# Patient Record
Sex: FEMALE | Race: WHITE | NOT HISPANIC OR LATINO | Employment: OTHER | URBAN - METROPOLITAN AREA
[De-identification: names, ages, dates, MRNs, and addresses within clinical notes are randomized per-mention and may not be internally consistent; named-entity substitution may affect disease eponyms.]

---

## 2022-02-15 ENCOUNTER — OFFICE VISIT (OUTPATIENT)
Dept: FAMILY MEDICINE CLINIC | Facility: CLINIC | Age: 22
End: 2022-02-15

## 2022-02-15 VITALS
DIASTOLIC BLOOD PRESSURE: 64 MMHG | WEIGHT: 125.6 LBS | HEART RATE: 68 BPM | TEMPERATURE: 96.9 F | RESPIRATION RATE: 16 BRPM | SYSTOLIC BLOOD PRESSURE: 86 MMHG | OXYGEN SATURATION: 98 %

## 2022-02-15 DIAGNOSIS — O03.9 MISCARRIAGE: Primary | ICD-10-CM

## 2022-02-15 PROCEDURE — 99203 OFFICE O/P NEW LOW 30 MIN: CPT | Performed by: NURSE PRACTITIONER

## 2022-02-15 NOTE — PROGRESS NOTES
Chief Complaint  Establish Care (No other complaints) and Abdominal Cramping (Lower area started last week with bleeding, bleeding has stop as of yesterday.)    Subjective          Deepika Birmingham presents to [facility]  History of Present Illness  New pt to me today.    Period was 4 days late but then very heavy with a large clot.  She also experienced abdominal cramping.  She bleed for 7 days when her normal period usually only last 3-4 days.  She passed several clots the entire time.  Stopped bleeding yesterday but still experiencing abdominal cramping that is off and on.  No lightheadedness, syncope, sweating. SHe feels good otherwise  Prior to this her periods are every 28 days.   She has appointment with OBGYN the first week of march.         Objective   Vital Signs:   BP (!) 86/64   Pulse 68   Temp 96.9 °F (36.1 °C)   Resp 16   Wt 57 kg (125 lb 9.6 oz)   SpO2 98%     Physical Exam  Vitals reviewed.   Constitutional:       General: She is not in acute distress.     Appearance: She is well-developed.   HENT:      Head: Normocephalic.   Cardiovascular:      Rate and Rhythm: Normal rate and regular rhythm.      Heart sounds: Normal heart sounds.   Pulmonary:      Effort: Pulmonary effort is normal.      Breath sounds: Normal breath sounds.   Neurological:      Mental Status: She is alert and oriented to person, place, and time.      Gait: Gait normal.   Psychiatric:         Behavior: Behavior normal.         Thought Content: Thought content normal.         Judgment: Judgment normal.        Result Review :                 Assessment and Plan    Diagnoses and all orders for this visit:    1. Miscarriage (Primary)        Follow Up   Return if symptoms worsen or fail to improve.  Patient was given instructions and counseling regarding her condition or for health maintenance advice. Please see specific information pulled into the AVS if appropriate.     Questionable miscarriage.  I  reassured her and counseled her that what she is going through is normal for miscarriage.  If the bleeding returns and is extremely heavy her abdominal pain gets worse she does need to go to the emergency room for possible hemorrhage.  Maintain appointment with OB/GYN in March.  Takes some over-the-counter iron just for the blood loss that she is experienced over the past 7 days.  Patient verbalized understanding    Mask and googles worn

## 2022-02-28 ENCOUNTER — OFFICE VISIT (OUTPATIENT)
Dept: OBSTETRICS AND GYNECOLOGY | Age: 22
End: 2022-02-28

## 2022-02-28 VITALS
DIASTOLIC BLOOD PRESSURE: 70 MMHG | HEIGHT: 62 IN | WEIGHT: 126.6 LBS | BODY MASS INDEX: 23.3 KG/M2 | SYSTOLIC BLOOD PRESSURE: 110 MMHG

## 2022-02-28 DIAGNOSIS — Z01.419 ENCOUNTER FOR GYNECOLOGICAL EXAMINATION WITHOUT ABNORMAL FINDING: Primary | ICD-10-CM

## 2022-02-28 PROCEDURE — 99385 PREV VISIT NEW AGE 18-39: CPT | Performed by: OBSTETRICS & GYNECOLOGY

## 2022-02-28 NOTE — PROGRESS NOTES
Routine Annual Visit    2022    Patient: Deepika Birmingham          MR#:[id number]      Chief Complaint   Patient presents with   • Gynecologic Exam     New pt, AE Today.   • Establish Care       History of Present Illness    21 y.o. female  who presents for annual exam.   Regular, monthly menses.     Had late menses recently and then had very heavy bleeding with passage of clots, nausea, diarrhea. Did not take pregnancy test then, wonders if may have been a miscarriage    Considering regnancy in  maybe  Condoms and NFP Til then    Moved here from [place] in January, went to [employer] in the past.  Plans to become a counselor. Her  is current at the [facility]    Health Maintenance  Gardasil unsure  Last pap: none previously    Patient's last menstrual period was 2022 (exact date).  Obstetric History:  OB History        0    Para   0    Term   0       0    AB   0    Living   0       SAB   0    IAB   0    Ectopic   0    Molar   0    Multiple   0    Live Births   0               Menstrual History:     Patient's last menstrual period was 2022 (exact date).       ________________________________________  Patient Active Problem List   Diagnosis   • Miscarriage       History reviewed. No pertinent past medical history.    Family History   Problem Relation Age of Onset   • Melanoma Mother        History reviewed. No pertinent surgical history.    Social History     Tobacco Use   Smoking Status Never Smoker   Smokeless Tobacco Not on file       currently has no medications in their medication list.  ________________________________________      Review of Systems   Constitutional: Negative for fever and unexpected weight change.   Respiratory: Negative for shortness of breath.    Cardiovascular: Negative for chest pain.   Gastrointestinal: Negative for abdominal pain, constipation and diarrhea.   Genitourinary: Negative for frequency and urgency.   Hematological:  "Negative for adenopathy.   Psychiatric/Behavioral: Negative for dysphoric mood.       Objective   Physical Exam    /70   Ht 157.5 cm (62\")   Wt 57.4 kg (126 lb 9.6 oz)   LMP 02/09/2022 (Exact Date)   Breastfeeding No   BMI 23.16 kg/m²    BP Readings from Last 3 Encounters:   02/28/22 110/70   02/15/22 (!) 86/64      Wt Readings from Last 3 Encounters:   02/28/22 57.4 kg (126 lb 9.6 oz)   02/15/22 57 kg (125 lb 9.6 oz)         BMI: Body mass index is 23.16 kg/m².       General:   alert, appears stated age and cooperative   Neck: No thyromegaly or LAD   Heart:: regular rate and rhythm, S1, S2 normal, no murmur, click, rub or gallop   Lungs: normal respiratory effort and auscultation   Abdomen: soft, non-tender, without masses or organomegaly   Breast: inspection negative, no nipple discharge or bleeding, no masses or nodularity palpable   Urethra and bladder: urethral meatus normal; bladder nontender to palpation;   Vulva: normal, Bartholin's, Urethra, Cooper's normal   Vagina: normal mucosa, normal discharge   Cervix: no lesions and nulliparous appearance   Uterus: normal size and anteverted   Adnexa: normal adnexa and no mass, fullness, tenderness       Assessment:    normal annual exam   Considering pregnancy this year    Plan:    Plan     []  Mammogram request made  [x]  PAP done  []  Labs:   []  GC/Chl/TV- she declines  []  DEXA scan   []  Referral for colonoscopy:     Discussed checking varicella, rubella immune statuses prior to pregnancy, also genetic screening    Counseling  [x]  Nutrition  [x]  Physical activity/regular exercise   [x]  Healthy weight  []  Injury prevention  []  Smoking cessation  []  Substance misuse/abuse  [x]  Sexual behavior  [x]  STD prevention  [x]  Contraception  []  Dental health  []  Mental health  []  Immunization  [x]  Encouraged SBE        Loretta Haley MD  02/28/2022  14:43 EST    "

## 2022-03-01 ENCOUNTER — PATIENT ROUNDING (BHMG ONLY) (OUTPATIENT)
Dept: OBSTETRICS AND GYNECOLOGY | Age: 22
End: 2022-03-01

## 2022-03-01 NOTE — PROGRESS NOTES
A MY CHART MESSAGE HAS BEEN SENT TO THE PATIENT [place] [facility].

## 2022-03-04 LAB
CONV .: NORMAL
CYTOLOGIST CVX/VAG CYTO: NORMAL
CYTOLOGY CVX/VAG DOC CYTO: NORMAL
CYTOLOGY CVX/VAG DOC THIN PREP: NORMAL
DX ICD CODE: NORMAL
HIV 1 & 2 AB SER-IMP: NORMAL
OTHER STN SPEC: NORMAL
STAT OF ADQ CVX/VAG CYTO-IMP: NORMAL

## 2022-06-30 ENCOUNTER — OFFICE VISIT (OUTPATIENT)
Dept: OBSTETRICS AND GYNECOLOGY | Age: 22
End: 2022-06-30

## 2022-06-30 VITALS
HEIGHT: 62 IN | DIASTOLIC BLOOD PRESSURE: 68 MMHG | WEIGHT: 127 LBS | BODY MASS INDEX: 23.37 KG/M2 | SYSTOLIC BLOOD PRESSURE: 120 MMHG

## 2022-06-30 DIAGNOSIS — O21.9 NAUSEA AND VOMITING DURING PREGNANCY: ICD-10-CM

## 2022-06-30 DIAGNOSIS — O36.71X1: ICD-10-CM

## 2022-06-30 DIAGNOSIS — Z3A.08 8 WEEKS GESTATION OF PREGNANCY: Primary | ICD-10-CM

## 2022-06-30 PROCEDURE — 99214 OFFICE O/P EST MOD 30 MIN: CPT | Performed by: NURSE PRACTITIONER

## 2022-06-30 PROCEDURE — 76817 TRANSVAGINAL US OBSTETRIC: CPT | Performed by: OBSTETRICS & GYNECOLOGY

## 2022-06-30 RX ORDER — MULTIPLE VITAMINS W/ MINERALS TAB 9MG-400MCG
1 TAB ORAL DAILY
COMMUNITY

## 2022-06-30 NOTE — PROGRESS NOTES
"Subjective     Chief Complaint   Patient presents with   • Possible Pregnancy     Lmp 2022       Deepika Pope is a 21 y.o.  whose LMP is Patient's last menstrual period was 2022.     Pt presents today for confirmation of pregnancy  U/S confirms live IUP at 8 weeks  This is her first pregnancy  She is having some nausea, a couple episodes of vomiting  She is supposed to start working next week  She has no other concerns today  Pt of Dr. Haley     No Additional Complaints Reported    The following portions of the patient's history were reviewed and updated as appropriate:vital signs, allergies, current medications, past medical history, past social history, past surgical history and problem list      Review of Systems   Pertinent items are noted in HPI.     Objective      /68   Ht 157.5 cm (62\")   Wt 57.6 kg (127 lb)   LMP 2022   BMI 23.23 kg/m²     Physical Exam    General:   alert and no distress   Heart: Not performed today   Lungs: Not performed today.   Breast: Not performed today   Neck: na   Abdomen: {Not performed today   CVA: Not performed today   Pelvis: Not performed today   Extremities: Not performed today   Neurologic: negative   Psychiatric: Normal affect, judgement, and mood       Lab Review   Labs: No data reviewed     Imaging   Ultrasound - Pelvic Vaginal    Assessment & Plan     ASSESSMENT  1. 8 weeks gestation of pregnancy    2. Viable fetus in abdominal pregnancy in first trimester, fetus 1    3. Nausea and vomiting during pregnancy        PLAN  1.   Orders Placed This Encounter   Procedures   • Urine Culture - Urine, Urine, Clean Catch   • Chlamydia trachomatis, Neisseria gonorrhoeae, Trichomonas vaginalis, PCR - Swab, Urine, Clean Catch   • OB Panel With HIV   • Varicella Zoster Antibody, IgG   • Hemoglobinopathy Fractionation Cascade   • Drug Profile Urine - 9 Drugs - Urine, Clean Catch       2. Medications prescribed this encounter:        New Medications " Ordered This Visit   Medications   • doxylamine (UNISOM) 25 MG tablet     Sig: Take 1 tablet by mouth At Night As Needed for Nausea.     Dispense:  30 tablet     Refill:  1       3. Prenatal labs today. Prenatal folder given to patient. Info given on cfDNA/carrier screening. Unisom qhs prn for N/V. SAB warnings.    Follow up: 4 week(s) OB intake Dr Tera Dempsey, APRN  6/30/2022

## 2022-07-02 LAB
ABO GROUP BLD: ABNORMAL
AMPHETAMINES UR QL SCN: NEGATIVE NG/ML
BACTERIA UR CULT: NO GROWTH
BACTERIA UR CULT: NORMAL
BARBITURATES UR QL SCN: NEGATIVE NG/ML
BASOPHILS # BLD AUTO: 0.1 X10E3/UL (ref 0–0.2)
BASOPHILS NFR BLD AUTO: 0 %
BENZODIAZ UR QL: NEGATIVE NG/ML
BLD GP AB SCN SERPL QL: NEGATIVE
BZE UR QL: NEGATIVE NG/ML
C TRACH RRNA SPEC QL NAA+PROBE: NEGATIVE
CANNABINOIDS UR QL SCN: NEGATIVE NG/ML
EOSINOPHIL # BLD AUTO: 0.2 X10E3/UL (ref 0–0.4)
EOSINOPHIL NFR BLD AUTO: 2 %
ERYTHROCYTE [DISTWIDTH] IN BLOOD BY AUTOMATED COUNT: 11.5 % (ref 11.7–15.4)
HBV SURFACE AG SERPL QL IA: NEGATIVE
HCT VFR BLD AUTO: 41.6 % (ref 34–46.6)
HCV AB S/CO SERPL IA: <0.1 S/CO RATIO (ref 0–0.9)
HGB A MFR BLD ELPH: 97.5 % (ref 96.4–98.8)
HGB A2 MFR BLD ELPH: 2.5 % (ref 1.8–3.2)
HGB BLD-MCNC: 14.2 G/DL (ref 11.1–15.9)
HGB F MFR BLD ELPH: 0 % (ref 0–2)
HGB FRACT BLD-IMP: NORMAL
HGB S MFR BLD ELPH: 0 %
HIV 1+2 AB+HIV1 P24 AG SERPL QL IA: NON REACTIVE
IMM GRANULOCYTES # BLD AUTO: 0 X10E3/UL (ref 0–0.1)
IMM GRANULOCYTES NFR BLD AUTO: 0 %
LYMPHOCYTES # BLD AUTO: 2.1 X10E3/UL (ref 0.7–3.1)
LYMPHOCYTES NFR BLD AUTO: 18 %
MCH RBC QN AUTO: 30.3 PG (ref 26.6–33)
MCHC RBC AUTO-ENTMCNC: 34.1 G/DL (ref 31.5–35.7)
MCV RBC AUTO: 89 FL (ref 79–97)
METHADONE UR QL SCN: NEGATIVE NG/ML
MONOCYTES # BLD AUTO: 0.6 X10E3/UL (ref 0.1–0.9)
MONOCYTES NFR BLD AUTO: 5 %
N GONORRHOEA RRNA SPEC QL NAA+PROBE: NEGATIVE
NEUTROPHILS # BLD AUTO: 8.5 X10E3/UL (ref 1.4–7)
NEUTROPHILS NFR BLD AUTO: 75 %
OPIATES UR QL: NEGATIVE NG/ML
PCP UR QL: NEGATIVE NG/ML
PLATELET # BLD AUTO: 275 X10E3/UL (ref 150–450)
PROPOXYPH UR QL SCN: NEGATIVE NG/ML
RBC # BLD AUTO: 4.68 X10E6/UL (ref 3.77–5.28)
RH BLD: POSITIVE
RPR SER QL: NON REACTIVE
RUBV IGG SERPL IA-ACNC: 11.4 INDEX
T VAGINALIS RRNA SPEC QL NAA+PROBE: NEGATIVE
VZV IGG SER IA-ACNC: <135 INDEX
WBC # BLD AUTO: 11.4 X10E3/UL (ref 3.4–10.8)

## 2022-07-05 PROBLEM — O09.899 MATERNAL VARICELLA, NON-IMMUNE: Status: ACTIVE | Noted: 2022-07-05

## 2022-07-05 PROBLEM — Z28.39 MATERNAL VARICELLA, NON-IMMUNE: Status: ACTIVE | Noted: 2022-07-05
